# Patient Record
Sex: MALE
[De-identification: names, ages, dates, MRNs, and addresses within clinical notes are randomized per-mention and may not be internally consistent; named-entity substitution may affect disease eponyms.]

---

## 2018-06-13 ENCOUNTER — HOSPITAL ENCOUNTER (EMERGENCY)
Dept: HOSPITAL 68 - ERH | Age: 43
End: 2018-06-13
Payer: SELF-PAY

## 2018-06-13 VITALS — DIASTOLIC BLOOD PRESSURE: 85 MMHG | SYSTOLIC BLOOD PRESSURE: 112 MMHG

## 2018-06-13 DIAGNOSIS — F10.129: Primary | ICD-10-CM

## 2018-06-13 LAB
ABSOLUTE GRANULOCYTE CT: 4.9 /CUMM (ref 1.4–6.5)
BASOPHILS # BLD: 0 /CUMM (ref 0–0.2)
BASOPHILS NFR BLD: 0.4 % (ref 0–2)
EOSINOPHIL # BLD: 0 /CUMM (ref 0–0.7)
EOSINOPHIL NFR BLD: 0.1 % (ref 0–5)
ERYTHROCYTE [DISTWIDTH] IN BLOOD BY AUTOMATED COUNT: 13.5 % (ref 11.5–14.5)
GRANULOCYTES NFR BLD: 79.6 % (ref 42.2–75.2)
HCT VFR BLD CALC: 43.4 % (ref 42–52)
LYMPHOCYTES # BLD: 1.1 /CUMM (ref 1.2–3.4)
MCH RBC QN AUTO: 31.3 PG (ref 27–31)
MCHC RBC AUTO-ENTMCNC: 34.3 G/DL (ref 33–37)
MCV RBC AUTO: 91.2 FL (ref 80–94)
MONOCYTES # BLD: 0.2 /CUMM (ref 0.1–0.6)
PLATELET # BLD: 158 /CUMM (ref 130–400)
PMV BLD AUTO: 9.8 FL (ref 7.4–10.4)
RED BLOOD CELL CT: 4.76 /CUMM (ref 4.7–6.1)
WBC # BLD AUTO: 6.2 /CUMM (ref 4.8–10.8)

## 2018-06-13 PROCEDURE — G0480 DRUG TEST DEF 1-7 CLASSES: HCPCS

## 2018-06-13 NOTE — CT SCAN REPORT
EXAMINATION:
NONCONTRAST HEAD CT
NONCONTRAST CERVICAL SPINE CT
 
INDICATION INFORMATION:
Mental status change, trauma
 
COMPARISON: None
 
TECHNIQUE:
Separate noncontrast CT examinations of the head and cervical spine were
performed. Coronal head CT images and coronal and sagittal cervical spine
images were created at the technologist workstation.
 
DLP: 1060.39 mGy-cm
 
FINDINGS:
Head:
There is no evidence of acute intracranial hemorrhage or territorial
infarction. No abnormal mass-effect or midline shift is seen. Gray to white
matter differentiation is well preserved. No extra-axial fluid collections
are identified.
 
The ventricles are normal in size. There is no abnormal attenuation within
the brain parenchyma. No acute fracture is seen. There is chronic appearing
deformity of the right lamina papyracea. The mastoid air cells and visualized
portions of the paranasal sinuses are well-aerated.
 
Cervical spine:
There is anatomic alignment of the vertebral bodies and posterior elements.
Vertebral body heights and intervertebral disc spaces are maintained. No
evidence of acute fracture. No prevertebral soft tissue swelling.
 
Visualized portions of the lung apices are unremarkable. The thyroid gland is
unremarkable.
 
IMPRESSION:
No acute findings identified in the head or cervical spine.
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
